# Patient Record
(demographics unavailable — no encounter records)

---

## 2025-07-14 NOTE — DISCUSSION/SUMMARY
[de-identified] : The patient and their family member(s) were advised of the diagnosis. The natural history of the pathology was explained in full to the patient and the family in layman's terms.  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 This condition usually responds to a period of relative rest from exacerbating activities and a dedicated stretching and strengthening program designed to increase core, hip and quads strength.  All of these efforts are designed to reduce the stress, both chronically and dynamically experienced by the "knee cap" at the patellofemoral joint. During that time, the occasional use of NSAIDS and Ice can significantly decrease the amount of inflammation and improve symptoms significantly.  They voiced a good understanding of these recommendations.    A prescription for physical therapy was provided as well as some local facilities the family can reach out to for PT appointments.  follow up in 4 weeks to reassess The patient and the family understands the plan of care as described above.  All questions have been answered. Thank you for allowing me to care for ANDRESSA. Sincerely, Bushra Carolina, DO, FAAP, CAQ[facility]

## 2025-07-14 NOTE — HISTORY OF PRESENT ILLNESS
[de-identified] : The patient is a 17 year old male who presents today complaining of BILATERAL KNEE pain. Date of Injury/Onset: A few months ago Pain at Rest: 2/10 Pain with Activity: 3/10 Mechanism of injury: Denies injury, creaking in the knees began a few months ago. Left knee has pain during extension and going up and downstairs. Right knee only has a creaking sound. Quality of symptoms: Creaking, discomfort ADHD, anxiety and celiac  Improves with: N/A Worse with: N/A Prior treatment/ Imaging: N/A Out of work: No School/Sport/Position/Occupation: Works at a summer camp, [facility]  otherwise healthy and well no mechanical symptoms aside from crepitus. no instability.  Additional Information: None Review of Systems: Constitutional: negative HEENT: negative CV: negative Pulm: negative GI: negative : negative Neuro: negative Skin: negative Endocrine: negative Heme: negative MSK: See HPI.

## 2025-07-14 NOTE — IMAGING
[de-identified] : Knee ROM 	 RIGHT EXTENSION:  + 5 hyperextension FLEXION: 130 	              	            	 LEFT	               EXTENSION: + 5 hyperextension FLEXION: 130   Hamstring tightness	 -20 deg lag bilaterally.  Exam of the bilateral Knee: Ecchymosis: NONE  Soft Tissues No redness, swelling, or localized warmth Effusion: NONE Tenderness:  RIGHT= nontender LEFT: Tender to palpation at medial patellar facet, lateral trochlear groove.  No overt joint line tenderness Special tests: Honey's:negative Pain with squatting/Duck walk (Morning Sun Test):Not Examined  Yasemin Test: Not Examined Salvatore's Test: negative  Knee stability:   Varus: No Laxity Valgus: No Laxity Lachman and/or Anterior Drawer: Firm End Point           Posterior Drawer: negative Posterolateral corner testing: Not Examined 	 Patella:  Crepitus:  mild crepitus noted R>L Inverted-J Sign: mild bilateral. Patellar apprehension: negative Patellar grind: mild tenderness    Patella: Mobility RIGHT QUADRANTS MEDIAL: 1-2  QUADRANTS LATERAL: 1-2                      LEFT	               QUADRANTS MEDIAL: 1-2  QUADRANTS LATERAL: 1-2   Strength: Atrophy: Quadriceps tone symmetric Patient able to perform a straight leg raise: Yes :No evidence of Extensor Lag  Pain with resisted strength testing: mild on the left with knee extension against resistance.

## 2025-07-14 NOTE — DATA REVIEWED
[FreeTextEntry1] : 4 views bilateral normal osseous exam no fracture flabella mild patellar tilt in trochlear groove bilateral (sits lateral)